# Patient Record
(demographics unavailable — no encounter records)

---

## 2019-08-06 NOTE — PHYS DOC
Past Medical History


Past Medical History:  Asthma, P.U.D.


Past Surgical History:  No Surgical History


Alcohol Use:  Occasionally


Drug Use:  Marijuana





Adult General


Chief Complaint


Chief Complaint:  LOWER EXT PAIN





HPI


HPI





Patient is a 38  year old male who presents with right hip joint sharp shooting 

pain that goes from the right hip joint down anterior thigh to to the knee. 

Patient states the pain only occurs when he has to bend at the hip or at the 

knee. Patient states he awoke with this pain on Thursday and knows that he did 

pull his groin muscle in the opposite leg and so he has been using the right leg

more so he favors the left leg because of a pulled groin muscle. Rates his pain 

a 10 out of 10. Patient states his only been taking Aleve for his pain.





Review of Systems


Review of Systems





Constitutional: Denies fever or chills []


Eyes: Denies change in visual acuity, redness, or eye pain []


HENT: Denies nasal congestion or sore throat []


Respiratory: Denies cough or shortness of breath []


Cardiovascular: No additional information not addressed in HPI []


GI: Denies abdominal pain, nausea, vomiting, bloody stools or diarrhea []


: Denies dysuria or hematuria []


Musculoskeletal: Denies back pain. Right hip joint pain []


Integument: Denies rash or skin lesions []


Neurologic: Denies headache, focal weakness or sensory changes []


Endocrine: Denies polyuria or polydipsia []





All other systems were reviewed and found to be within normal limits, except as 

documented in this note.





Current Medications


Current Medications





Current Medications








 Medications


  (Trade)  Dose


 Ordered  [facility]  Start Time


 Stop Time Status Last Admin


Dose Admin


 


 Acetaminophen/


 Hydrocodone Bitart


  (Lortab 5/325)  1 tab  1X  ONCE  19 14:00


 19 14:01 DC 19 14:00


1 TAB











Allergies


Allergies





Allergies








Coded Allergies Type Severity Reaction Last Updated Verified


 


  No Known Drug Allergies    16 No











Physical Exam


Physical Exam





Constitutional: Well developed, well nourished, no acute distress, non-toxic 

appearance. []


HENT: Normocephalic, atraumatic, bilateral external ears normal, oropharynx 

moist, no oral exudates, nose normal. []


Eyes: PERRLA, EOMI, conjunctiva normal, no discharge. [] 


Neck: Normal range of motion, no tenderness, supple, no stridor. [] 


Cardiovascular:Heart rate regular rhythm, no murmur []


Lungs & Thorax:  Bilateral breath sounds clear to auscultation []


Abdomen: Bowel sounds normal, soft, no tenderness, no masses, no pulsatile 

masses. [] 


Skin: Warm, dry, no erythema, no rash. [] 


Back: No tenderness, no CVA tenderness. [] 


Extremities: No tenderness, no cyanosis, no clubbing, ROM intact but pain is 

caused with bending of knee or having patient stand or sit, no edema. [] 


Neurologic: Alert and oriented X 3, normal motor function, normal sensory 

function, no focal deficits noted. []


Psychologic: Affect normal, judgement normal, mood normal. []





Current Patient Data


Vital Signs





                                   Vital Signs








  Date Time  Temp Pulse Resp B/P (MAP) Pulse Ox O2 Delivery O2 Flow Rate FiO2


 


19 14:00   16  99 Room Air  


 


19 13:47 98.6 67  119/58 (78)    





 98.6       











EKG


EKG


[]





Radiology/Procedures


Radiology/Procedures


[]


Impressions:


[facility]


                    [address] [place] [address]


                                 [phone]


                                        


                                 IMAGING REPORT





                                     Signed





PATIENT: GABE PÉREZ    ACCOUNT: [id number]     MRN#: [id number]


: 1981           LOCATION: ER              AGE: 38


SEX: M                    EXAM DT: 19         ACCESSION#: [id number]


STATUS: REG ER            ORD. PHYSICIAN: BLOSSOM LAMB


REASON: pain with movement, no known injury


PROCEDURE: HIP RIGHT 2V WITH PELVIS





EXAM: Pelvis and right hip, 3 views.


 


HISTORY: Pain.


 


COMPARISON: None.


 


FINDINGS: A frontal view of the pelvis and frontal and frog-leg views of 


the right hip are obtained. There is no fracture, dislocation or 


subluxation.


 


IMPRESSION: No acute osseous finding.


 


Electronically signed by: Telma Briscoe MD (2019 2:13 PM) [facility]














DICTATED and SIGNED BY:     TELMA BRISCOE MD


DATE:     19 1413








Course & Med Decision Making


Course & Med Decision Making


Patient is a 38  year old male who presents with right hip joint sharp shooting 

pain that goes from the right hip joint down anterior thigh to to the knee. 

Patient states the pain only occurs when he has to bend at the hip or at the 

knee. Patient states he awoke with this pain on Thursday and knows that he did 

pull his groin muscle in the opposite leg and so he has been using the right leg

 more so he favors the left leg because of a pulled groin muscle. Rates his pain

 a 10 out of 10. Patient states his only been taking Aleve for his pain. 

Ambulatory with a steady gait. Patient states that he works at the [employer] is

 on his feet for 10-12 hours a day. There is no calf pain palpation and no 

swelling to the extremity. There is no pain to palpation to the hip or the 

thigh. Patient has full range of motion and his ankle, knee and hip but is 

painful in his hip joint with movement. Skin is pink warm and dry. Patient 

denies any numbness or tingling, weakness in the joint or in the leg. There is 

no laxity in the joints. No swelling of the joints or redness.





X-rays as no acute findings. Patient will given a Medrol Dosepak and Norco and 

to continue taking his Aleve at home. Patient follow-up with his primary care 

provider.





Dragon Disclaimer


Dragon Disclaimer


This electronic medical record was generated, in whole or in part, using a voice

 recognition dictation system.





Departure


Departure


Impression:  


   Primary Impression:  


   Hip pain


Disposition:   HOME, SELF-CARE


Condition:  STABLE


Referrals:  


JOSE PACHECO TREVOR-[facility] (PCP)


Patient Instructions:  Hip Bursitis





Additional Instructions:  


Follow-up with primary care provider. Take medication as prescribed. Continue 

taking Aleve.


Scripts


Methylprednisolone (MEDROL) 4 Mg Tab.ds.pk


1 PKG PO UD, #1 PKG


   Prov: BLOSSOM LAMB APRN         19 


Hydrocodone/Apap 5-325 (NORCO 5-325 TABLET) 1 Each Tablet


1 TAB PO PRN Q6HRS PRN for PAIN, #8 TAB 0 Refills


   Prov: BLOSSOM LAMB APRN         19





Problem Qualifiers








   Primary Impression:  


   Hip pain


   Laterality:  right  Qualified Codes:  M25.551 - Pain in right hip








BLOSSOM LAMB             Aug 6, 2019 13:57

## 2019-08-06 NOTE — RAD
EXAM: Pelvis and right hip, 3 views.

 

HISTORY: Pain.

 

COMPARISON: None.

 

FINDINGS: A frontal view of the pelvis and frontal and frog-leg views of 

the right hip are obtained. There is no fracture, dislocation or 

subluxation.

 

IMPRESSION: No acute osseous finding.

 

Electronically signed by: Telma Nuñez MD (8/6/2019 2:13 PM) [facility]